# Patient Record
(demographics unavailable — no encounter records)

---

## 2025-04-03 NOTE — ASSESSMENT
[FreeTextEntry1] : Mr. Duggan has severe, symptomatic AS - we will proceed with the TAVR diagnostic testing -- schedule cardiac CTA and LHC  - he will need to see a CT Surgeon and we will schedule his TAVR

## 2025-04-05 NOTE — HISTORY OF PRESENT ILLNESS
[FreeTextEntry1] : Mr. Duggan is referred for evaluation of aortic stenosis. He is a 69y/o male with a history of HTN, HLD, CAD s/p PCI (~2-3 years ago) and TIA. KATIE was done at [facility] on 3/3 and showed severe AS.  He reports that he has had worsening dyspnea on exertion over the past few years. He denies chest pain/pressure and had a PCI about 2-3 years ago. He does not currently complain of leg swelling and denies syncope.   NYHA II  KATIE from 3/3/2025  1. Left ventricular systolic function is normal. There are no regional wall motion abnormalities seen.  2. Normal right ventricular cavity size and normal right ventricular systolic function.  3. Structurally normal mitral valve with normal leaflet excursion. There is calcification of the mitral valve annulus. There is mild mitral regurgitation.  4. The aortic valve appears trileaflet with reduced systolic excursion. There is calcification of the aortic valve leaflets. There is severe aortic stenosis. The peak transaortic velocity is 4.34 m/s, peak transaortic gradient is 75.3 mmHg and mean transaortic gradient is 41.0 mmHg with an LVOT/aortic valve VTI ratio of 0.21. The aortic valve area is estimated at 0.71 cm by the continuity equation and 0.80 cm by 2D planimetry. There is no evidence of aortic regurgitation.  5. No atheroma in the visualized portions of the proximal ascending aorta. Mild non-mobile atheroma in the visualized portions of the transverse aortic arch. Mild non-mobile atheroma in the visualized portions of the descending aorta.  6. The left atrium is normal in size. There is no evidence of left atrial or left atrial appendage thrombus. The left atrial appendage emptying velocity is normal.  7. Agitated saline injection was negative for intracardiac shunt.   Assessment/Plan Aortic valve stenosis, severe Coronary artery disease status post PCI TIA Hypertension Hyperlipidemia  --Discussed with the patient findings to date.  He has been experiencing increasing shortness of breath/dyspnea upon exertion and fatigue over the last few months.  Reviewed with patient findings from imaging studies which were gone over with Director of Structural Imaging/Dr. Feliz.  The patient has severe aortic valve stenosis. -- Discussed what is severe aortic valve stenosis.  The associated signs and symptoms of severe aortic valve stenosis was gone over.  The natural pathophysiology of untreated severe aortic valve stenosis was gone over. -- The various treatment options for severe aortic valve stenosis was reviewed including medical therapy, TAVR and SAVR.  The pros/cons and risk/benefits of the various treatment options were gone over. -- Due to the patient's age and comorbidities he is felt to be an appropriate candidate to be worked up for TAVR.  Indications and details of the TAVR procedure reviewed.  Benefits and risks were explained.  Risks include but not limited to infection, bleeding, arrhythmia, TIA/stroke, hemodynamic instability, vascular injury, need for urgent surgery and death. -- As part of the patient's workup he will need to undergo heart CTA and coronary angiogram.  Indications and details of these procedures were reviewed.  Benefits and risks were discussed.  Plan for patient to undergo cardiac catheterization at [facility] on 4/21/2025 with Dr. Díaz. --Continue amlodipine 5 mg daily, hydralazine 50 mg twice a day and losartan 100 mg daily. -- Continue aspirin 81 mg daily and clopidogrel 75 mg daily.. -- Continue atorvastatin 80 mg daily.  Findings and plan discussed with cardiology/Dr. Blank and cardiac surgery/Dr. Lopez.

## 2025-04-05 NOTE — PHYSICAL EXAM
[Normal Rate] : normal [Rhythm Regular] : regular [Normal S1] : normal S1 [Normal S2] : normal S2 [No Pitting Edema] : no pitting edema present [2+] : left 2+ [II] : a grade 2 [Normal] : no rash, no skin lesions [de-identified] : III/VI crescendo decrescendo murmur heard loudest at right upper sternal border radiating to carotids

## 2025-04-05 NOTE — PHYSICAL EXAM
[Normal Rate] : normal [Rhythm Regular] : regular [Normal S1] : normal S1 [Normal S2] : normal S2 [No Pitting Edema] : no pitting edema present [2+] : left 2+ [II] : a grade 2 [Normal] : no rash, no skin lesions [de-identified] : III/VI crescendo decrescendo murmur heard loudest at right upper sternal border radiating to carotids

## 2025-04-05 NOTE — REVIEW OF SYSTEMS
[Dyspnea on exertion] : dyspnea during exertion [Fever] : no fever [Chills] : no chills [Feeling Fatigued] : feeling fatigued [SOB] : shortness of breath [Chest Discomfort] : no chest discomfort [Lower Ext Edema] : no extremity edema [Syncope] : no syncope [Negative] : Heme/Lymph

## 2025-04-05 NOTE — PHYSICAL EXAM
[Normal Rate] : normal [Rhythm Regular] : regular [Normal S1] : normal S1 [Normal S2] : normal S2 [No Pitting Edema] : no pitting edema present [2+] : left 2+ [II] : a grade 2 [Normal] : no rash, no skin lesions [de-identified] : III/VI crescendo decrescendo murmur heard loudest at right upper sternal border radiating to carotids

## 2025-04-05 NOTE — REASON FOR VISIT
[Structural Heart and Valve Disease] : structural heart and valve disease [FreeTextEntry3] : Yayo Blank

## 2025-04-05 NOTE — HISTORY OF PRESENT ILLNESS
[FreeTextEntry1] : Mr. Duggan is referred for evaluation of aortic stenosis. He is a 71y/o male with a history of HTN, HLD, CAD s/p PCI (~2-3 years ago) and TIA. KATIE was done at [facility] on 3/3 and showed severe AS.  He reports that he has had worsening dyspnea on exertion over the past few years. He denies chest pain/pressure and had a PCI about 2-3 years ago. He does not currently complain of leg swelling and denies syncope.   NYHA II  KATIE from 3/3/2025  1. Left ventricular systolic function is normal. There are no regional wall motion abnormalities seen.  2. Normal right ventricular cavity size and normal right ventricular systolic function.  3. Structurally normal mitral valve with normal leaflet excursion. There is calcification of the mitral valve annulus. There is mild mitral regurgitation.  4. The aortic valve appears trileaflet with reduced systolic excursion. There is calcification of the aortic valve leaflets. There is severe aortic stenosis. The peak transaortic velocity is 4.34 m/s, peak transaortic gradient is 75.3 mmHg and mean transaortic gradient is 41.0 mmHg with an LVOT/aortic valve VTI ratio of 0.21. The aortic valve area is estimated at 0.71 cm by the continuity equation and 0.80 cm by 2D planimetry. There is no evidence of aortic regurgitation.  5. No atheroma in the visualized portions of the proximal ascending aorta. Mild non-mobile atheroma in the visualized portions of the transverse aortic arch. Mild non-mobile atheroma in the visualized portions of the descending aorta.  6. The left atrium is normal in size. There is no evidence of left atrial or left atrial appendage thrombus. The left atrial appendage emptying velocity is normal.  7. Agitated saline injection was negative for intracardiac shunt.   Assessment/Plan Aortic valve stenosis, severe Coronary artery disease status post PCI TIA Hypertension Hyperlipidemia  --Discussed with the patient findings to date.  He has been experiencing increasing shortness of breath/dyspnea upon exertion and fatigue over the last few months.  Reviewed with patient findings from imaging studies which were gone over with Director of Structural Imaging/Dr. Feliz.  The patient has severe aortic valve stenosis. -- Discussed what is severe aortic valve stenosis.  The associated signs and symptoms of severe aortic valve stenosis was gone over.  The natural pathophysiology of untreated severe aortic valve stenosis was gone over. -- The various treatment options for severe aortic valve stenosis was reviewed including medical therapy, TAVR and SAVR.  The pros/cons and risk/benefits of the various treatment options were gone over. -- Due to the patient's age and comorbidities he is felt to be an appropriate candidate to be worked up for TAVR.  Indications and details of the TAVR procedure reviewed.  Benefits and risks were explained.  Risks include but not limited to infection, bleeding, arrhythmia, TIA/stroke, hemodynamic instability, vascular injury, need for urgent surgery and death. -- As part of the patient's workup he will need to undergo heart CTA and coronary angiogram.  Indications and details of these procedures were reviewed.  Benefits and risks were discussed.  Plan for patient to undergo cardiac catheterization at [facility] on 4/21/2025 with Dr. Díaz. --Continue amlodipine 5 mg daily, hydralazine 50 mg twice a day and losartan 100 mg daily. -- Continue aspirin 81 mg daily and clopidogrel 75 mg daily.. -- Continue atorvastatin 80 mg daily.  Findings and plan discussed with cardiology/Dr. Blank and cardiac surgery/Dr. Lopez.

## 2025-04-05 NOTE — PHYSICAL EXAM
[Normal Rate] : normal [Rhythm Regular] : regular [Normal S1] : normal S1 [Normal S2] : normal S2 [No Pitting Edema] : no pitting edema present [2+] : left 2+ [II] : a grade 2 [Normal] : no rash, no skin lesions [de-identified] : III/VI crescendo decrescendo murmur heard loudest at right upper sternal border radiating to carotids

## 2025-04-05 NOTE — HISTORY OF PRESENT ILLNESS
[FreeTextEntry1] : Mr. Duggan is referred for evaluation of aortic stenosis. He is a 71y/o male with a history of HTN, HLD, CAD s/p PCI (~2-3 years ago) and TIA. KATIE was done at [facility] on 3/3 and showed severe AS.  He reports that he has had worsening dyspnea on exertion over the past few years. He denies chest pain/pressure and had a PCI about 2-3 years ago. He does not currently complain of leg swelling and denies syncope.   NYHA II  KATIE from 3/3/2025  1. Left ventricular systolic function is normal. There are no regional wall motion abnormalities seen.  2. Normal right ventricular cavity size and normal right ventricular systolic function.  3. Structurally normal mitral valve with normal leaflet excursion. There is calcification of the mitral valve annulus. There is mild mitral regurgitation.  4. The aortic valve appears trileaflet with reduced systolic excursion. There is calcification of the aortic valve leaflets. There is severe aortic stenosis. The peak transaortic velocity is 4.34 m/s, peak transaortic gradient is 75.3 mmHg and mean transaortic gradient is 41.0 mmHg with an LVOT/aortic valve VTI ratio of 0.21. The aortic valve area is estimated at 0.71 cm by the continuity equation and 0.80 cm by 2D planimetry. There is no evidence of aortic regurgitation.  5. No atheroma in the visualized portions of the proximal ascending aorta. Mild non-mobile atheroma in the visualized portions of the transverse aortic arch. Mild non-mobile atheroma in the visualized portions of the descending aorta.  6. The left atrium is normal in size. There is no evidence of left atrial or left atrial appendage thrombus. The left atrial appendage emptying velocity is normal.  7. Agitated saline injection was negative for intracardiac shunt.   Assessment/Plan Aortic valve stenosis, severe Coronary artery disease status post PCI TIA Hypertension Hyperlipidemia  --Discussed with the patient findings to date.  He has been experiencing increasing shortness of breath/dyspnea upon exertion and fatigue over the last few months.  Reviewed with patient findings from imaging studies which were gone over with Director of Structural Imaging/Dr. Feliz.  The patient has severe aortic valve stenosis. -- Discussed what is severe aortic valve stenosis.  The associated signs and symptoms of severe aortic valve stenosis was gone over.  The natural pathophysiology of untreated severe aortic valve stenosis was gone over. -- The various treatment options for severe aortic valve stenosis was reviewed including medical therapy, TAVR and SAVR.  The pros/cons and risk/benefits of the various treatment options were gone over. -- Due to the patient's age and comorbidities he is felt to be an appropriate candidate to be worked up for TAVR.  Indications and details of the TAVR procedure reviewed.  Benefits and risks were explained.  Risks include but not limited to infection, bleeding, arrhythmia, TIA/stroke, hemodynamic instability, vascular injury, need for urgent surgery and death. -- As part of the patient's workup he will need to undergo heart CTA and coronary angiogram.  Indications and details of these procedures were reviewed.  Benefits and risks were discussed.  Plan for patient to undergo cardiac catheterization at [facility] on 4/21/2025 with Dr. Díaz. --Continue amlodipine 5 mg daily, hydralazine 50 mg twice a day and losartan 100 mg daily. -- Continue aspirin 81 mg daily and clopidogrel 75 mg daily.. -- Continue atorvastatin 80 mg daily.  Findings and plan discussed with cardiology/Dr. Blank and cardiac surgery/Dr. Lopez.

## 2025-04-05 NOTE — HISTORY OF PRESENT ILLNESS
[FreeTextEntry1] : Mr. Duggan is referred for evaluation of aortic stenosis. He is a 71y/o male with a history of HTN, HLD, CAD s/p PCI (~2-3 years ago) and TIA. KATIE was done at [facility] on 3/3 and showed severe AS.  He reports that he has had worsening dyspnea on exertion over the past few years. He denies chest pain/pressure and had a PCI about 2-3 years ago. He does not currently complain of leg swelling and denies syncope.   NYHA II  KATIE from 3/3/2025  1. Left ventricular systolic function is normal. There are no regional wall motion abnormalities seen.  2. Normal right ventricular cavity size and normal right ventricular systolic function.  3. Structurally normal mitral valve with normal leaflet excursion. There is calcification of the mitral valve annulus. There is mild mitral regurgitation.  4. The aortic valve appears trileaflet with reduced systolic excursion. There is calcification of the aortic valve leaflets. There is severe aortic stenosis. The peak transaortic velocity is 4.34 m/s, peak transaortic gradient is 75.3 mmHg and mean transaortic gradient is 41.0 mmHg with an LVOT/aortic valve VTI ratio of 0.21. The aortic valve area is estimated at 0.71 cm by the continuity equation and 0.80 cm by 2D planimetry. There is no evidence of aortic regurgitation.  5. No atheroma in the visualized portions of the proximal ascending aorta. Mild non-mobile atheroma in the visualized portions of the transverse aortic arch. Mild non-mobile atheroma in the visualized portions of the descending aorta.  6. The left atrium is normal in size. There is no evidence of left atrial or left atrial appendage thrombus. The left atrial appendage emptying velocity is normal.  7. Agitated saline injection was negative for intracardiac shunt.   Assessment/Plan Aortic valve stenosis, severe Coronary artery disease status post PCI TIA Hypertension Hyperlipidemia  --Discussed with the patient findings to date.  He has been experiencing increasing shortness of breath/dyspnea upon exertion and fatigue over the last few months.  Reviewed with patient findings from imaging studies which were gone over with Director of Structural Imaging/Dr. Feliz.  The patient has severe aortic valve stenosis. -- Discussed what is severe aortic valve stenosis.  The associated signs and symptoms of severe aortic valve stenosis was gone over.  The natural pathophysiology of untreated severe aortic valve stenosis was gone over. -- The various treatment options for severe aortic valve stenosis was reviewed including medical therapy, TAVR and SAVR.  The pros/cons and risk/benefits of the various treatment options were gone over. -- Due to the patient's age and comorbidities he is felt to be an appropriate candidate to be worked up for TAVR.  Indications and details of the TAVR procedure reviewed.  Benefits and risks were explained.  Risks include but not limited to infection, bleeding, arrhythmia, TIA/stroke, hemodynamic instability, vascular injury, need for urgent surgery and death. -- As part of the patient's workup he will need to undergo heart CTA and coronary angiogram.  Indications and details of these procedures were reviewed.  Benefits and risks were discussed.  Plan for patient to undergo cardiac catheterization at [facility] on 4/21/2025 with Dr. Díaz. --Continue amlodipine 5 mg daily, hydralazine 50 mg twice a day and losartan 100 mg daily. -- Continue aspirin 81 mg daily and clopidogrel 75 mg daily.. -- Continue atorvastatin 80 mg daily.  Findings and plan discussed with cardiology/Dr. Blank and cardiac surgery/Dr. Lopez.

## 2025-04-15 NOTE — PHYSICAL EXAM
[General Appearance - Alert] : alert [General Appearance - In No Acute Distress] : in no acute distress [General Appearance - Well Nourished] : well nourished [General Appearance - Well Developed] : well developed [Sclera] : the sclera and conjunctiva were normal [Outer Ear] : the ears and nose were normal in appearance [Neck Appearance] : the appearance of the neck was normal [Jugular Venous Distention Increased] : there was no jugular-venous distention [] : no respiratory distress [Exaggerated Use Of Accessory Muscles For Inspiration] : no accessory muscle use [Respiration, Rhythm And Depth] : normal respiratory rhythm and effort [Auscultation Breath Sounds / Voice Sounds] : lungs were clear to auscultation bilaterally [Apical Impulse] : the apical impulse was normal [Heart Rate And Rhythm] : heart rate was normal and rhythm regular [Heart Sounds] : normal S1 and S2 [FreeTextEntry1] : 3/6 RUSB systolic murmur [Abdomen Soft] : soft [Abdomen Tenderness] : non-tender [Involuntary Movements] : no involuntary movements were seen [No Focal Deficits] : no focal deficits [Oriented To Time, Place, And Person] : oriented to person, place, and time [Impaired Insight] : insight and judgment were intact [Affect] : the affect was normal [Mood] : the mood was normal

## 2025-04-15 NOTE — HISTORY OF PRESENT ILLNESS
[FreeTextEntry1] : Mr. Duggan is referred for evaluation of aortic stenosis. He is a 71y/o male with a history of HTN, HLD, CAD s/p PCI (~2-3 years ago) and TIA. KATIE was done at [facility] on 3/3 and showed severe AS.  He reports that he has had worsening dyspnea on exertion over the past few years. He denies chest pain/pressure and had a PCI about 2-3 years ago. He does not currently complain of leg swelling and denies syncope.  NYHA II  KATIE from 3/3/2025: Left ventricular systolic function is normal. There are no regional wall motion abnormalities seen. Normal right ventricular cavity size and normal right ventricular systolic function. Structurally normal mitral valve with normal leaflet excursion. There is calcification of the mitral valve annulus. There is mild mitral regurgitation. The aortic valve appears trileaflet with reduced systolic excursion. There is calcification of the aortic valve leaflets. There is severe aortic stenosis. The peak transaortic velocity is 4.34 m/s, peak transaortic gradient is 75.3 mmHg and mean transaortic gradient is 41.0 mmHg with an LVOT/aortic valve VTI ratio of 0.21. The aortic valve area is estimated at 0.71 cm by the continuity equation and 0.80 cm by 2D planimetry. There is no evidence of aortic regurgitation. No atheroma in the visualized portions of the proximal ascending aorta. Mild non-mobile atheroma in the visualized portions of the transverse aortic arch. Mild non-mobile atheroma in the visualized portions of the descending aorta. The left atrium is normal in size. There is no evidence of left atrial or left atrial appendage thrombus. The left atrial appendage emptying velocity is normal.  Agitated saline injection was negative for intracardiac shunt.  Today he presents and reports he is able to walk flat without trouble however if he walks uphill or walks carrying something he gets winded fast.  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  He was playing 27 holes of golf a day 2 years ago. Over the past 2 years he has noticed a big difference. Reports fatigue in the afternoon as well. Denies CP, swelling, palpitations, dizziness. Lives alone, stays active as he can, now plays 18 holes of golf.  Has had episodes of gout, last flare was 6 months ago.   NYHA II-III

## 2025-04-15 NOTE — ASSESSMENT
[FreeTextEntry1] : I reviewed the cardiac imaging, medical records and reports with patient and discussed the case.  I discussed the risks, benefits and alternatives to both surgical aortic valve replacement (SAVR) and Transcatheter Aortic Valve Replacement (TAVR). Risks included but not limited to bleeding, stroke, Myocardial Infarction, kidney problems, blood transfusion, permanent  pacemaker implantation, infections and death. I also discussed the various approaches in detail.  I feel that the patient will benefit and is a candidate for TAVR. All questions and concerns were addressed and patient agrees to proceed with TAVR.  Plan:  - Proceed with TAVR 4/25 as scheduled

## 2025-04-15 NOTE — HISTORY OF PRESENT ILLNESS
[FreeTextEntry1] : Mr. Duggan is referred for evaluation of aortic stenosis. He is a 69y/o male with a history of HTN, HLD, CAD s/p PCI (~2-3 years ago) and TIA. KATIE was done at [facility] on 3/3 and showed severe AS.  He reports that he has had worsening dyspnea on exertion over the past few years. He denies chest pain/pressure and had a PCI about 2-3 years ago. He does not currently complain of leg swelling and denies syncope.  NYHA II  KATIE from 3/3/2025: Left ventricular systolic function is normal. There are no regional wall motion abnormalities seen. Normal right ventricular cavity size and normal right ventricular systolic function. Structurally normal mitral valve with normal leaflet excursion. There is calcification of the mitral valve annulus. There is mild mitral regurgitation. The aortic valve appears trileaflet with reduced systolic excursion. There is calcification of the aortic valve leaflets. There is severe aortic stenosis. The peak transaortic velocity is 4.34 m/s, peak transaortic gradient is 75.3 mmHg and mean transaortic gradient is 41.0 mmHg with an LVOT/aortic valve VTI ratio of 0.21. The aortic valve area is estimated at 0.71 cm by the continuity equation and 0.80 cm by 2D planimetry. There is no evidence of aortic regurgitation. No atheroma in the visualized portions of the proximal ascending aorta. Mild non-mobile atheroma in the visualized portions of the transverse aortic arch. Mild non-mobile atheroma in the visualized portions of the descending aorta. The left atrium is normal in size. There is no evidence of left atrial or left atrial appendage thrombus. The left atrial appendage emptying velocity is normal.  Agitated saline injection was negative for intracardiac shunt.  Today he presents and reports he is able to walk flat without trouble however if he walks uphill or walks carrying something he gets winded fast.  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  He was playing 27 holes of golf a day 2 years ago. Over the past 2 years he has noticed a big difference. Reports fatigue in the afternoon as well. Denies CP, swelling, palpitations, dizziness. Lives alone, stays active as he can, now plays 18 holes of golf.  Has had episodes of gout, last flare was 6 months ago.   NYHA II-III

## 2025-04-15 NOTE — END OF VISIT
[FreeTextEntry3] : Written by Charles Monzon NP, acting as a scribe for Dr. Carrasco The documentation recorded by the scribe accurately reflects the service I personally performed and the decisions made by me. Signature Ezekiel Carrasco MD.

## 2025-04-15 NOTE — PHYSICAL EXAM
[General Appearance - Alert] : alert [General Appearance - In No Acute Distress] : in no acute distress [General Appearance - Well Nourished] : well nourished [General Appearance - Well Developed] : well developed [Sclera] : the sclera and conjunctiva were normal [Outer Ear] : the ears and nose were normal in appearance [Neck Appearance] : the appearance of the neck was normal [Jugular Venous Distention Increased] : there was no jugular-venous distention [] : no respiratory distress [Respiration, Rhythm And Depth] : normal respiratory rhythm and effort [Exaggerated Use Of Accessory Muscles For Inspiration] : no accessory muscle use [Auscultation Breath Sounds / Voice Sounds] : lungs were clear to auscultation bilaterally [Apical Impulse] : the apical impulse was normal [Heart Rate And Rhythm] : heart rate was normal and rhythm regular [Heart Sounds] : normal S1 and S2 [FreeTextEntry1] : 3/6 RUSB systolic murmur [Abdomen Soft] : soft [Abdomen Tenderness] : non-tender [Involuntary Movements] : no involuntary movements were seen [No Focal Deficits] : no focal deficits [Oriented To Time, Place, And Person] : oriented to person, place, and time [Impaired Insight] : insight and judgment were intact [Affect] : the affect was normal [Mood] : the mood was normal

## 2025-05-13 NOTE — REASON FOR VISIT
[de-identified] : 70-year-old M with PMHx of CAD, HTN, HLD, CVA, s/p TPA 2022, AS s/p TAVR on May 2nd, post procedure patient had HTN, and R facial numbness x 45 minutes, seen by Neuro, CT imaging was neg for acute findings  patient was discharged home on ASA/ Plavix.  Pt  was readmitted to [facility] 5/7 with severe pain in the L upper jaw radiated to L temple and L side neck associated with black spots in both eyes and slurred speech (noticed by family at 12:45 pm) which lasted 20 minutes, CT head and CTA H/N and head MRI negative for acute pathology.  Neurology consulted Dr. Reid, recs appreciated - symptoms likely related, to migraine with aura, possible TIA in setting of HTN urgency.  Patient has not had similar events after discharge.   home meds: alpha-lipoic acid 600 mg oral capsule: 1 cap(s) orally 2 times a day amLODIPine 10 mg oral tablet: 1 tab(s) orally once a day aspirin 81 mg oral delayed release tablet: 1 tab(s) orally once a day (at bedtime) atorvastatin 80 mg oral tablet: 1 tab(s) orally once a day (at bedtime) clopidogrel 75 mg oral tablet: 1 tab(s) orally once a day colchicine 0.6 mg oral tablet: 1 tab(s) orally once a day (at bedtime) hydrALAZINE 25 mg oral tablet: 3 tab(s) orally 3 times a day losartan 100 mg oral tablet: 1 tab(s) orally once a day metoprolol tartrate 50 mg oral tablet: 1 tab(s) orally 2 times a day omeprazole 40 mg oral delayed release capsule: 1 cap(s) orally once a day Percocet 5 mg-325 mg oral tablet: 1 tab(s) orally every 8 hours as needed for headache MDD: three tabs  Pt state that he has been short of breath with decreased energy since the TAVR.  It has not been worsening but has not been getting better.  Denies chest pain, chest pressure.

## 2025-05-13 NOTE — REASON FOR VISIT
Spoke with patient  History of IBS-C, GERD    Patient c/o continued constipation, passing BM every 3-4 days, generalized abdominal bloating, nausea for 30 minutes after taking amitiza, and gassy  Taking amitiza once daily on and empty stomach, probiotic daily, and pantoprazole daily  Advised patient take amitiza with a meal  Patient states "I don't know if I should keep taking this"  Patient also voiced concern about taking miralax which she will not do  Denies vomiting, fever, chills, SOB weakness  Any suggestions? [de-identified] : 70-year-old M with PMHx of CAD, HTN, HLD, CVA, s/p TPA 2022, AS s/p TAVR on May 2nd, post procedure patient had HTN, and R facial numbness x 45 minutes, seen by Neuro, CT imaging was neg for acute findings  patient was discharged home on ASA/ Plavix.  Pt  was readmitted to [facility] 5/7 with severe pain in the L upper jaw radiated to L temple and L side neck associated with black spots in both eyes and slurred speech (noticed by family at 12:45 pm) which lasted 20 minutes, CT head and CTA H/N and head MRI negative for acute pathology.  Neurology consulted Dr. Reid, recs appreciated - symptoms likely related, to migraine with aura, possible TIA in setting of HTN urgency.  Patient has not had similar events after discharge.   home meds: alpha-lipoic acid 600 mg oral capsule: 1 cap(s) orally 2 times a day amLODIPine 10 mg oral tablet: 1 tab(s) orally once a day aspirin 81 mg oral delayed release tablet: 1 tab(s) orally once a day (at bedtime) atorvastatin 80 mg oral tablet: 1 tab(s) orally once a day (at bedtime) clopidogrel 75 mg oral tablet: 1 tab(s) orally once a day colchicine 0.6 mg oral tablet: 1 tab(s) orally once a day (at bedtime) hydrALAZINE 25 mg oral tablet: 3 tab(s) orally 3 times a day losartan 100 mg oral tablet: 1 tab(s) orally once a day metoprolol tartrate 50 mg oral tablet: 1 tab(s) orally 2 times a day omeprazole 40 mg oral delayed release capsule: 1 cap(s) orally once a day Percocet 5 mg-325 mg oral tablet: 1 tab(s) orally every 8 hours as needed for headache MDD: three tabs  Pt state that he has been short of breath with decreased energy since the TAVR.  It has not been worsening but has not been getting better.  Denies chest pain, chest pressure.

## 2025-05-13 NOTE — ASSESSMENT
[FreeTextEntry1] : 70-year-old M with PMHx of CAD, HTN, HLD, CVA, s/p TPA 2022, AS s/p TAVR on May 2nd with postop neuro sx seen by neuro work up including CT and MRI negative for acute pathology.  ? migraines.  persistent SOB after TAVR.  - pt to f/u with neuro if sx persists - repeat TTE (already scheduled Monday with [facility], pt will move up the echo to this week) - abx ppx discussed with patient

## 2025-05-13 NOTE — END OF VISIT
[FreeTextEntry3] : Written by Charles Monzon NP, acting as a scribe for Dr. Lopez The documentation recorded by the scribe accurately reflects the service I personally performed and the decisions made by me. Signature Renae Lopez MD.